# Patient Record
Sex: FEMALE | Race: WHITE | NOT HISPANIC OR LATINO | Employment: FULL TIME | URBAN - METROPOLITAN AREA
[De-identification: names, ages, dates, MRNs, and addresses within clinical notes are randomized per-mention and may not be internally consistent; named-entity substitution may affect disease eponyms.]

---

## 2021-01-04 ENCOUNTER — EH NON-PROVIDER (OUTPATIENT)
Dept: OCCUPATIONAL MEDICINE | Facility: CLINIC | Age: 51
End: 2021-01-04
Payer: COMMERCIAL

## 2021-01-04 ENCOUNTER — NON-PROVIDER VISIT (OUTPATIENT)
Dept: OCCUPATIONAL MEDICINE | Facility: CLINIC | Age: 51
End: 2021-01-04
Payer: COMMERCIAL

## 2021-01-04 DIAGNOSIS — Z02.89 ENCOUNTER FOR OCCUPATIONAL HEALTH EXAMINATION INVOLVING RESPIRATOR: ICD-10-CM

## 2021-01-04 PROCEDURE — 99999 PR NO CHARGE: CPT | Performed by: PREVENTIVE MEDICINE

## 2021-01-04 PROCEDURE — 94375 RESPIRATORY FLOW VOLUME LOOP: CPT | Performed by: PREVENTIVE MEDICINE

## 2021-01-06 DIAGNOSIS — Z23 NEED FOR VACCINATION: ICD-10-CM

## 2021-01-07 ENCOUNTER — OFFICE VISIT (OUTPATIENT)
Dept: URGENT CARE | Facility: CLINIC | Age: 51
End: 2021-01-07
Payer: COMMERCIAL

## 2021-01-07 VITALS
DIASTOLIC BLOOD PRESSURE: 78 MMHG | SYSTOLIC BLOOD PRESSURE: 100 MMHG | HEART RATE: 63 BPM | OXYGEN SATURATION: 98 % | WEIGHT: 180 LBS | TEMPERATURE: 96.7 F | BODY MASS INDEX: 30.73 KG/M2 | RESPIRATION RATE: 18 BRPM | HEIGHT: 64 IN

## 2021-01-07 DIAGNOSIS — R09.81 NASAL CONGESTION: ICD-10-CM

## 2021-01-07 DIAGNOSIS — R05.9 COUGH: ICD-10-CM

## 2021-01-07 DIAGNOSIS — J02.0 STREP PHARYNGITIS: ICD-10-CM

## 2021-01-07 LAB
INT CON NEG: NEGATIVE
INT CON POS: POSITIVE
S PYO AG THROAT QL: POSITIVE

## 2021-01-07 PROCEDURE — 99204 OFFICE O/P NEW MOD 45 MIN: CPT | Performed by: PHYSICIAN ASSISTANT

## 2021-01-07 PROCEDURE — 87880 STREP A ASSAY W/OPTIC: CPT | Performed by: PHYSICIAN ASSISTANT

## 2021-01-07 RX ORDER — AMOXICILLIN 500 MG/1
500 CAPSULE ORAL 2 TIMES DAILY
Qty: 20 CAP | Refills: 0 | Status: SHIPPED | OUTPATIENT
Start: 2021-01-07 | End: 2021-01-17

## 2021-01-07 RX ORDER — ESCITALOPRAM OXALATE 10 MG/1
10 TABLET ORAL DAILY
COMMUNITY

## 2021-01-07 RX ORDER — PANTOPRAZOLE SODIUM 40 MG/1
40 TABLET, DELAYED RELEASE ORAL DAILY
COMMUNITY

## 2021-01-07 ASSESSMENT — ENCOUNTER SYMPTOMS
VOMITING: 0
EYES NEGATIVE: 1
FEVER: 1
WHEEZING: 0
HEMOPTYSIS: 0
SHORTNESS OF BREATH: 1
COUGH: 1
SPUTUM PRODUCTION: 1
SORE THROAT: 1
NAUSEA: 0
CHILLS: 1
DIARRHEA: 0
ABDOMINAL PAIN: 0

## 2021-01-07 NOTE — PROGRESS NOTES
"Subjective:   Cynthia Slade is a 50 y.o. female who presents for Cough (heachache cough stuffy nose sore throat for 1 day)      HPI  Onset of symptoms 2 days ago.   HA which has improved.   Mild intermittent Cough. Productive. Negative hemoptysis.   Low grade temperature. 99.9 fever max.   Nasal congestion, runny nose.   SOB mostly at night and due to fatigue.   Loose stools. Denies any black or bloody stools.   Denies any chest pain, abdominal pain, nausea, vomiting, loss of taste or smell.    Nyquil and tylenol, ibuprofen, mucinex with mild relief.   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   Nonsmoker.   Denies any pertinent past medical history.       Review of Systems   Constitutional: Positive for chills, fever and malaise/fatigue.   HENT: Positive for congestion, ear pain and sore throat.    Eyes: Negative.    Respiratory: Positive for cough, sputum production and shortness of breath. Negative for hemoptysis and wheezing.    Cardiovascular: Negative for chest pain.   Gastrointestinal: Negative for abdominal pain, diarrhea, nausea and vomiting.   Genitourinary: Negative.    Skin: Negative.        Medications:    • escitalopram Tabs  • pantoprazole Tbec    Allergies: Latex and Morphine    Problem List: Cynthia Slade does not have a problem list on file.    Surgical History:  No past surgical history on file.    Past Social Hx: Cynthia Slade  reports that she has never smoked. She has never used smokeless tobacco.     Past Family Hx:  Cynthia Slade family history is not on file.     Problem list, medications, and allergies reviewed by myself today in Epic.     Objective:     /78 (BP Location: Right arm, Patient Position: Sitting, BP Cuff Size: Adult long)   Pulse 63   Temp 35.9 °C (96.7 °F)   Resp 18   Ht 1.626 m (5' 4\")   Wt 81.6 kg (180 lb)   SpO2 98%   BMI 30.90 kg/m²     Physical Exam  Vitals signs reviewed.   Constitutional:       General: She is not in acute " distress.     Appearance: She is not ill-appearing or toxic-appearing.   HENT:      Right Ear: Tympanic membrane normal.      Left Ear: Tympanic membrane normal.      Nose: Congestion present.      Mouth/Throat:      Mouth: Mucous membranes are dry.      Pharynx: Uvula midline. Posterior oropharyngeal erythema present. No pharyngeal swelling, oropharyngeal exudate or uvula swelling.      Tonsils: No tonsillar exudate or tonsillar abscesses.   Eyes:      Conjunctiva/sclera: Conjunctivae normal.      Pupils: Pupils are equal, round, and reactive to light.   Neck:      Musculoskeletal: Neck supple.   Cardiovascular:      Rate and Rhythm: Normal rate and regular rhythm.      Heart sounds: Normal heart sounds.   Pulmonary:      Effort: Pulmonary effort is normal. No respiratory distress.      Breath sounds: Normal breath sounds. No wheezing, rhonchi or rales.   Lymphadenopathy:      Cervical: No cervical adenopathy.   Skin:     General: Skin is warm and dry.   Neurological:      General: No focal deficit present.      Mental Status: She is alert and oriented to person, place, and time.   Psychiatric:         Mood and Affect: Mood normal.         Behavior: Behavior normal.         Assessment/Associated Orders     1. Strep pharyngitis  amoxicillin (AMOXIL) 500 MG Cap   2. Cough     3. Nasal congestion         Medical Decision Making      Positive Strep A    Discussed with patient POSITIVE Strep A.  Discussed treatment of amoxicillin for 10 days, salt water gargles, soft foods, cool liquids, ibuprofen and Tylenol for any pain or fevers.   Return to the urgent care if symptoms are not improving or any worsening symptoms or concerns.     Patient is a new [employer] Employee. Due to her symptoms of cough and nasal congestion, highly recommend she be tested for COVID-19 as well. Patient given the employee hotline number to call to be tested. They will be following up with her.  Plenty of oral hydration and rest   Over the counter  cough suppressant as directed.  Tylenol or ibuprofen for pain and fever as directed.   Saline nasal spray and Flonase as a decongestant.   Infection control measures at home. Stay away from people, Hand washing, covering sneeze/cough, disinfect surfaces.   Remain home from work, school, and other populated environments. Work note provided with information of quarantine measures per CDC guidelines.   Overall, the patient is well-appearing. They are not hypoxic, afebrile, and a normal pulmonary exam.    I personally reviewed test results pertinent to today's visit.   Red flags discussed and indications to immediately call 911 or present to the Emergency Department.   Supportive care, differential diagnoses, and indications for immediate follow-up discussed with patient.    Patient expresses understanding and agrees to plan. Patient denies any other questions or concerns.     Advised the patient to follow-up with the primary care physician for recheck, reevaluation, and consideration of further management.    Please note that this dictation was created using voice recognition software. I have made a reasonable attempt to correct obvious errors, but I expect that there are errors of grammar and possibly content that I did not discover before finalizing the note.    This note was electronically signed by Mack Mcneal PA-C

## 2021-01-23 ENCOUNTER — IMMUNIZATION (OUTPATIENT)
Dept: FAMILY PLANNING/WOMEN'S HEALTH CLINIC | Facility: IMMUNIZATION CENTER | Age: 51
End: 2021-01-23
Attending: INTERNAL MEDICINE
Payer: COMMERCIAL

## 2021-01-23 DIAGNOSIS — Z23 NEED FOR VACCINATION: ICD-10-CM

## 2021-01-23 DIAGNOSIS — Z23 ENCOUNTER FOR VACCINATION: Primary | ICD-10-CM

## 2021-01-23 PROCEDURE — 91300 PFIZER SARS-COV-2 VACCINE: CPT

## 2021-01-23 PROCEDURE — 0001A PFIZER SARS-COV-2 VACCINE: CPT
